# Patient Record
Sex: MALE | Race: WHITE | NOT HISPANIC OR LATINO | URBAN - METROPOLITAN AREA
[De-identification: names, ages, dates, MRNs, and addresses within clinical notes are randomized per-mention and may not be internally consistent; named-entity substitution may affect disease eponyms.]

---

## 2023-06-30 LAB
ALANINE AMINOTRANSFERASE (SGPT) (U/L) IN SER/PLAS: 22 U/L (ref 10–52)
ALBUMIN (G/DL) IN SER/PLAS: 4.6 G/DL (ref 3.4–5)
ALKALINE PHOSPHATASE (U/L) IN SER/PLAS: 43 U/L (ref 33–136)
ANION GAP IN SER/PLAS: 12 MMOL/L (ref 10–20)
ASPARTATE AMINOTRANSFERASE (SGOT) (U/L) IN SER/PLAS: 23 U/L (ref 9–39)
BILIRUBIN TOTAL (MG/DL) IN SER/PLAS: 1.1 MG/DL (ref 0–1.2)
CALCIUM (MG/DL) IN SER/PLAS: 9.9 MG/DL (ref 8.6–10.3)
CARBON DIOXIDE, TOTAL (MMOL/L) IN SER/PLAS: 27 MMOL/L (ref 21–32)
CHLORIDE (MMOL/L) IN SER/PLAS: 101 MMOL/L (ref 98–107)
CREATININE (MG/DL) IN SER/PLAS: 1.39 MG/DL (ref 0.5–1.3)
ERYTHROCYTE DISTRIBUTION WIDTH (RATIO) BY AUTOMATED COUNT: 13.4 % (ref 11.5–14.5)
ERYTHROCYTE MEAN CORPUSCULAR HEMOGLOBIN CONCENTRATION (G/DL) BY AUTOMATED: 33.5 G/DL (ref 32–36)
ERYTHROCYTE MEAN CORPUSCULAR VOLUME (FL) BY AUTOMATED COUNT: 91 FL (ref 80–100)
ERYTHROCYTES (10*6/UL) IN BLOOD BY AUTOMATED COUNT: 5.32 X10E12/L (ref 4.5–5.9)
GFR MALE: 56 ML/MIN/1.73M2
GLUCOSE (MG/DL) IN SER/PLAS: 86 MG/DL (ref 74–99)
HEMATOCRIT (%) IN BLOOD BY AUTOMATED COUNT: 48.3 % (ref 41–52)
HEMOGLOBIN (G/DL) IN BLOOD: 16.2 G/DL (ref 13.5–17.5)
LEUKOCYTES (10*3/UL) IN BLOOD BY AUTOMATED COUNT: 5.4 X10E9/L (ref 4.4–11.3)
NRBC (PER 100 WBCS) BY AUTOMATED COUNT: 0 /100 WBC (ref 0–0)
PLATELETS (10*3/UL) IN BLOOD AUTOMATED COUNT: 210 X10E9/L (ref 150–450)
POTASSIUM (MMOL/L) IN SER/PLAS: 4 MMOL/L (ref 3.5–5.3)
PROTEIN TOTAL: 7.4 G/DL (ref 6.4–8.2)
SODIUM (MMOL/L) IN SER/PLAS: 136 MMOL/L (ref 136–145)
UREA NITROGEN (MG/DL) IN SER/PLAS: 18 MG/DL (ref 6–23)

## 2023-07-05 ENCOUNTER — HOSPITAL ENCOUNTER (OUTPATIENT)
Dept: DATA CONVERSION | Facility: HOSPITAL | Age: 63
End: 2023-07-06
Attending: ORTHOPAEDIC SURGERY | Admitting: ORTHOPAEDIC SURGERY

## 2023-07-05 DIAGNOSIS — M25.751 OSTEOPHYTE, RIGHT HIP: ICD-10-CM

## 2023-07-05 DIAGNOSIS — I25.10 ATHEROSCLEROTIC HEART DISEASE OF NATIVE CORONARY ARTERY WITHOUT ANGINA PECTORIS: ICD-10-CM

## 2023-07-05 DIAGNOSIS — I10 ESSENTIAL (PRIMARY) HYPERTENSION: ICD-10-CM

## 2023-07-05 DIAGNOSIS — I25.2 OLD MYOCARDIAL INFARCTION: ICD-10-CM

## 2023-07-05 DIAGNOSIS — M19.90 UNSPECIFIED OSTEOARTHRITIS, UNSPECIFIED SITE: ICD-10-CM

## 2023-07-05 DIAGNOSIS — M16.11 UNILATERAL PRIMARY OSTEOARTHRITIS, RIGHT HIP: ICD-10-CM

## 2023-07-06 LAB
ANION GAP IN SER/PLAS: 11 MMOL/L (ref 10–20)
BASOPHILS (10*3/UL) IN BLOOD BY AUTOMATED COUNT: 0.02 X10E9/L (ref 0–0.1)
BASOPHILS/100 LEUKOCYTES IN BLOOD BY AUTOMATED COUNT: 0.3 % (ref 0–2)
CALCIUM (MG/DL) IN SER/PLAS: 8.5 MG/DL (ref 8.6–10.3)
CARBON DIOXIDE, TOTAL (MMOL/L) IN SER/PLAS: 25 MMOL/L (ref 21–32)
CHLORIDE (MMOL/L) IN SER/PLAS: 102 MMOL/L (ref 98–107)
CREATININE (MG/DL) IN SER/PLAS: 1.3 MG/DL (ref 0.5–1.3)
EOSINOPHILS (10*3/UL) IN BLOOD BY AUTOMATED COUNT: 0.02 X10E9/L (ref 0–0.7)
EOSINOPHILS/100 LEUKOCYTES IN BLOOD BY AUTOMATED COUNT: 0.3 % (ref 0–6)
ERYTHROCYTE DISTRIBUTION WIDTH (RATIO) BY AUTOMATED COUNT: 13.5 % (ref 11.5–14.5)
ERYTHROCYTE MEAN CORPUSCULAR HEMOGLOBIN CONCENTRATION (G/DL) BY AUTOMATED: 33.2 G/DL (ref 32–36)
ERYTHROCYTE MEAN CORPUSCULAR VOLUME (FL) BY AUTOMATED COUNT: 91 FL (ref 80–100)
ERYTHROCYTES (10*6/UL) IN BLOOD BY AUTOMATED COUNT: 4.15 X10E12/L (ref 4.5–5.9)
GFR MALE: 61 ML/MIN/1.73M2
GLUCOSE (MG/DL) IN SER/PLAS: 141 MG/DL (ref 74–99)
HEMATOCRIT (%) IN BLOOD BY AUTOMATED COUNT: 37.6 % (ref 41–52)
HEMOGLOBIN (G/DL) IN BLOOD: 12.5 G/DL (ref 13.5–17.5)
IMMATURE GRANULOCYTES/100 LEUKOCYTES IN BLOOD BY AUTOMATED COUNT: 0.5 % (ref 0–0.9)
LEUKOCYTES (10*3/UL) IN BLOOD BY AUTOMATED COUNT: 7.9 X10E9/L (ref 4.4–11.3)
LYMPHOCYTES (10*3/UL) IN BLOOD BY AUTOMATED COUNT: 1 X10E9/L (ref 1.2–4.8)
LYMPHOCYTES/100 LEUKOCYTES IN BLOOD BY AUTOMATED COUNT: 12.6 % (ref 13–44)
MONOCYTES (10*3/UL) IN BLOOD BY AUTOMATED COUNT: 0.81 X10E9/L (ref 0.1–1)
MONOCYTES/100 LEUKOCYTES IN BLOOD BY AUTOMATED COUNT: 10.2 % (ref 2–10)
NEUTROPHILS (10*3/UL) IN BLOOD BY AUTOMATED COUNT: 6.02 X10E9/L (ref 1.2–7.7)
NEUTROPHILS/100 LEUKOCYTES IN BLOOD BY AUTOMATED COUNT: 76.1 % (ref 40–80)
NRBC (PER 100 WBCS) BY AUTOMATED COUNT: 0 /100 WBC (ref 0–0)
PLATELETS (10*3/UL) IN BLOOD AUTOMATED COUNT: 146 X10E9/L (ref 150–450)
POTASSIUM (MMOL/L) IN SER/PLAS: 3.8 MMOL/L (ref 3.5–5.3)
SODIUM (MMOL/L) IN SER/PLAS: 134 MMOL/L (ref 136–145)
UREA NITROGEN (MG/DL) IN SER/PLAS: 12 MG/DL (ref 6–23)

## 2023-07-07 LAB
ANION GAP IN SER/PLAS: NORMAL
BASOPHILS (10*3/UL) IN BLOOD BY AUTOMATED COUNT: NORMAL
BASOPHILS/100 LEUKOCYTES IN BLOOD BY AUTOMATED COUNT: NORMAL
CALCIUM (MG/DL) IN SER/PLAS: NORMAL
CARBON DIOXIDE, TOTAL (MMOL/L) IN SER/PLAS: NORMAL
CHLORIDE (MMOL/L) IN SER/PLAS: NORMAL
CREATININE (MG/DL) IN SER/PLAS: NORMAL
EOSINOPHILS (10*3/UL) IN BLOOD BY AUTOMATED COUNT: NORMAL
EOSINOPHILS/100 LEUKOCYTES IN BLOOD BY AUTOMATED COUNT: NORMAL
ERYTHROCYTE DISTRIBUTION WIDTH (RATIO) BY AUTOMATED COUNT: NORMAL
ERYTHROCYTE MEAN CORPUSCULAR HEMOGLOBIN CONCENTRATION (G/DL) BY AUTOMATED: NORMAL
ERYTHROCYTE MEAN CORPUSCULAR VOLUME (FL) BY AUTOMATED COUNT: NORMAL
ERYTHROCYTES (10*6/UL) IN BLOOD BY AUTOMATED COUNT: NORMAL
GFR FEMALE: NORMAL
GFR MALE: NORMAL
GLUCOSE (MG/DL) IN SER/PLAS: NORMAL
HEMATOCRIT (%) IN BLOOD BY AUTOMATED COUNT: NORMAL
HEMOGLOBIN (G/DL) IN BLOOD: NORMAL
IMMATURE GRANULOCYTES/100 LEUKOCYTES IN BLOOD BY AUTOMATED COUNT: NORMAL
LEUKOCYTES (10*3/UL) IN BLOOD BY AUTOMATED COUNT: NORMAL
LYMPHOCYTES (10*3/UL) IN BLOOD BY AUTOMATED COUNT: NORMAL
LYMPHOCYTES/100 LEUKOCYTES IN BLOOD BY AUTOMATED COUNT: NORMAL
MANUAL DIFFERENTIAL Y/N: NORMAL
MONOCYTES (10*3/UL) IN BLOOD BY AUTOMATED COUNT: NORMAL
MONOCYTES/100 LEUKOCYTES IN BLOOD BY AUTOMATED COUNT: NORMAL
NEUTROPHILS (10*3/UL) IN BLOOD BY AUTOMATED COUNT: NORMAL
NEUTROPHILS/100 LEUKOCYTES IN BLOOD BY AUTOMATED COUNT: NORMAL
NRBC (PER 100 WBCS) BY AUTOMATED COUNT: NORMAL
PLATELETS (10*3/UL) IN BLOOD AUTOMATED COUNT: NORMAL
POTASSIUM (MMOL/L) IN SER/PLAS: NORMAL
SODIUM (MMOL/L) IN SER/PLAS: NORMAL
UREA NITROGEN (MG/DL) IN SER/PLAS: NORMAL

## 2023-09-29 VITALS — BODY MASS INDEX: 24.26 KG/M2 | HEIGHT: 68 IN | WEIGHT: 160.05 LBS

## 2023-09-30 NOTE — PROGRESS NOTES
Service: Orthopaedics     Subjective Data:   JOSE LUIS JIMENEZ is a 63 year old Male who is Hospital Day # 2 and POD #1 for 1. RIGHT HIP REPLACEMENT;    Objective Data:     Objective Information:      T   P  R  BP   MAP  SpO2   Value  36.6  48  16  115/77      97%  Date/Time 7/6 8:16 7/6 8:16 7/6 8:16 7/6 8:16    7/6 8:16  Range  (35.9C - 37C )  (46 - 66 )  (16 - 20 )  (92 - 147 )/ (53 - 99 )    (96% - 99% )  Highest temp of 37 C was recorded at 7/6 0:00      Pain reported at 7/6 8:30: 4 = Moderate    ---- Intake and Output  -----  Mn/Dy/Year Time  Intake   Output  Net  Jul 6, 2023 6:00 am  585   750  -165  Jul 5, 2023 10:00 pm  500   0  500  Jul 5, 2023 2:00 pm  1400   0  1400    The Intake and Output Totals for the last 24 hours are:      Intake   Output  Net      2485   750  1735      T   P  R  BP   MAP  SpO2   Value  36.6  48  16  115/77      97%  Date/Time 7/6 8:16 7/6 8:16 7/6 8:16 7/6 8:16    7/6 8:16  Range  (35.9C - 37C )  (46 - 66 )  (16 - 20 )  (92 - 147 )/ (53 - 99 )    (96% - 99% )  Highest temp of 37 C was recorded at 7/6 0:00    Physical Exam Narrative:  ·  Physical Exam:    Postop day 1-right total hip arthroplasty  Patient has been mobilizing well  Notices gradual improvement  Right hip  Surgical site is clean and dry  He can actively flex and extend his knee and his ankle  Calf muscles are soft        Medication:    Medications:          Continuous Medications       --------------------------------    1. Lactated Ringers Infusion:  1000  mL  IntraVenous  <Continuous>         Scheduled Medications       --------------------------------    1. Acetaminophen:  650  mg  Oral  Every 6 Hours    2. Aspirin Enteric Coated:  81  mg  Oral  2 Times a Day    3. ceFAZolin 2 gram/ D5W 100 mL Premix IVPB:  100  mL  IntraVenous Piggyback  Once    4. Docusate:  100  mg  Oral  2 Times a Day    5. Polyethylene Glycol:  17  gram(s)  Oral  2 Times a Day         PRN Medications        --------------------------------    1. Cyclobenzaprine:  10  mg  Oral  3 Times a Day    2. diphenhydrAMINE:  25  mg  Oral  Every 6 Hours    3. Ketorolac Injectable:  15  mg  IntraVenous Push  Every 6 Hours    4. Magnesium Hydroxide -Al Hydrox -Simethicone Oral Liquid:  30  mL  Oral  Every 6 Hours    5. Morphine Injectable:  2  mg  IntraVenous Push  Every 4 Hours    6. Ondansetron Injectable:  4  mg  IntraVenous Push  Every 6 Hours    7. oxyCODONE Immediate Release:  10  mg  Oral  Every 4 Hours    8. oxyCODONE Immediate Release:  5  mg  Oral  Every 4 Hours        Recent Lab Results:    Results:    CBC: 7/6/2023 05:42              \     Hgb     /                              \     12.5 L    /  WBC  ----------------  Plt               7.9       ----------------    146 L            /     Hct     \                              /     37.6 L    \            RBC: 4.15 L    MCV: 91     Neutrophil %: 76.1      BMP: 7/6/2023 05:42  NA+        Cl-     BUN  /                         134 L   102    12  /  --------------------------------  Glucose                ---------------------------  141 H    K+     HCO3-   Creat \                         3.8  25    1.30  \  Calcium : 8.5 L    Anion Gap : 11        I have reviewed these laboratory results:    Complete Blood Count + Differential  06-Jul-2023 05:42:00      Result Value    White Blood Cell Count  7.9    Nucleated Erythrocyte Count  0.0    Red Blood Cell Count  4.15   L   HGB  12.5   L   HCT  37.6   L   MCV  91    MCHC  33.2    PLT  146   L   RDW-CV  13.5    Neutrophil %  76.1    Immature Granulocytes %  0.5    Lymphocyte %  12.6    Monocyte %  10.2    Eosinophil %  0.3    Basophil %  0.3    Neutrophil Count  6.02    Lymphocyte Count  1.00   L   Monocyte Count  0.81    Eosinophil Count  0.02    Basophil Count  0.02      Basic Metabolic Panel  06-Jul-2023 05:42:00      Result Value    Glucose, Serum  141   H   NA  134   L   K  3.8    CL  102    Bicarbonate, Serum  25     Anion Gap, Serum  11    BUN  12    CREAT  1.30    GFR Male  61    Calcium, Serum  8.5   L       Radiology Results:    Results:        Impression:    Stable right hip arthroplasty with intact hardware.        Xray Hip 2 View [Jul 5 2023  5:22PM]      Impression:    Right total hip arthroplasty with no acute bony abnormality.     Xray Pelvis 1 or 2 View [Jul 5 2023  2:34PM]    X-ray of the pelvis AP view  Right total hip arthroplasty is in satisfactory alignment and position  Implant bone interface is satisfactory      Assessment and Plan:   Code Status:  ·  Code Status Full Code       Impression 1: Postop day 1-right total hip arthroplasty   Plan for Impression 1: Patient is progressing well  Denies any significant discomfort in his right hip  He is mobilizing full weightbearing on his hip    May be discharged home today with home PT  Follow-up in the orthopedic office in approximately 10 days       Electronic Signatures:  Raven Short)  (Signed 06-Jul-2023 12:14)   Authored: Service, Subjective Data, Objective Data, Assessment  and Plan, Note Completion      Last Updated: 06-Jul-2023 12:14 by Raven Short)

## 2023-09-30 NOTE — H&P
History & Physical Reviewed:   I have reviewed the History and Physical dated:  30-Jun-2023   History and Physical reviewed and relevant findings noted. Patient examined to review pertinent physical  findings.: No significant changes   Home Medications Reviewed: no changes noted   Allergies Reviewed: no changes noted       ERAS (Enhanced Recovery After Surgery):  ·  ERAS Patient: no     Consent:   COVID-19 Consent:  ·  COVID-19 Risk Consent Surgeon has reviewed key risks related to the risk of elton COVID-19 and if they contract COVID-19 what the risks are.       Electronic Signatures:  Raven Short)  (Signed 05-Jul-2023 13:59)   Authored: History & Physical Reviewed, ERAS, Consent,  Note Completion      Last Updated: 05-Jul-2023 13:59 by Raven Short)

## 2023-09-30 NOTE — DISCHARGE SUMMARY
Send Summary:   Discharge Summary Providers:  Provider Role Provider Name   · Attending Raven Short   · Referring Raven Short   · Primary Rose Porter       Note Recipients: Rose Porter MD - [id number] []  Raven Short MD       Discharge:    Summary:   Admission Date: .05-Jul-2023 09:35:00   Discharge Date: 06-Jul-2023   Attending Physician at Discharge: Raven Short   Admission Reason: right hip osteoarthritis   Final Discharge Diagnoses: Acute postoperative pain   Procedures: Date: 05-Jul-2023 13:30:00  Procedure Name: 1. RIGHT HIP REPLACEMENT   Condition at Discharge: Satisfactory   Disposition at Discharge: Home Health Care - New   Vital Signs:        T   P  R  BP   MAP  SpO2   Value  36.6  48  16  115/77      97%  Date/Time 7/6 8:16 7/6 8:16 7/6 8:16 7/6 8:16    7/6 8:16  Range  (35.9C - 37C )  (46 - 66 )  (16 - 20 )  (92 - 147 )/ (53 - 99 )    (96% - 99% )  Highest temp of 37 C was recorded at 7/6 0:00    Date:            Weight/Scale Type:  Height:   05-Jul-2023 17:20  72.6  kg         172.3  cm  Physical Exam:    Constitutional: Well developed, awake/alert,  no distress, alert and cooperative   Eyes: EOMI, clear sclera   ENMT: mucous membranes moist, no lesions  seen   Respiratory/Thorax: Patent airways, with  good chest expansion, thorax symmetric   Musculoskeletal: Right hip dressing dry and  intact.   Bilateral lower extremities distally with intact dorsiflexion/plantarflexion/EHL.  DP palpable 2+/2   Neurological: alert,  intact senses   Lymphatic: No significant lymphadenopathy   Psychological: Appropriate mood and behavior     Hospital Course:    Hospital day #2, postoperative day #1 of right total hip arthroplasty for osteoarthritis right hip.  Patient had satisfactory postop course. He was noted to have  mild asymptomatic bradycardia intraoperatively, HR in 50's,  was monitored closely on telemetry and advised to follow up with his cardiologist,   Jonathan within the next week. Otherwise, he had no major complications. He fully participated in physical  and Occupational Therapy.   Used his incentive spirometry as directed.  Tolerated diet with no nausea vomiting, passed flatus, and urinated well.  Discharge to home with home health care in satisfactory condition.      Discharge Information:    and Continuing Care:   Lab Results - Pending:    None  Radiology Results - Pending: None   Discharge Instructions:    Activity:           activity as tolerated.          May shower..            May not return to school/work until follow-up visit with.            May not drive until follow-up visit.  or while taking narcotic pain medication          No pushing, pulling, or lifting objects greater than 5 pounds until follow-up visit.            Weight-bearing Instructions: weight-bearing as tolerated right leg.      Nutrition/Diet:           regular    Wound Care:           Wound Site:   right hip          Wound Type:   surgical incision          Cleanse With:   soap and water          Instructions:   no lotions, creams, or tub soaks          Other Instructions:   may remove surgical dressing post operative day #7 and leave open to air if no drainage    Additional Orders:           Special Equipment:   walker          Additional Instructions:   *Call Dr. Short for any problems and/or concerns.  *Maintain hip precautions  *Weight bearing as tolerated  *You may shower, do not soak or scrub incision; pat dry  *Apply ice to hip as needed to minimize swelling, on 20 minutes, off 20 minutes  *Continue the coughing and deep breathing exercises that you learned in the hospital  *Move around as much as you can tolerate because movement can help you heal and helps prevent stiffness, skin sores, and blood clots    Call Doctor right away for:  *Increased hip pain  *Pain or swelling in your calf of leg  *Fever above 100.4/shaking chills  *Excessive swelling, increased redness or  drainage around the incision  *Your incision breaks open  *Chest pain/shortness of breath, call 911    After the procedure it is common to have pain and swelling.      -To help prevent hip dislocation, follow instructions about movement restrictions as told by your physician:  *Do not cross your legs at the knees. To remind yourself about this, you may keep a pillow between your legs while lying in bed  *Do not bend at the hip and waist or bend farther than 90 degrees of flexion.   To avoid bending this far: do not bring your knees higher than your hips, do not  something from the floor while sitting in a chair, avoid sitting in low chairs, used raised toilet seat (if needed), when standing up from a seated position- keep  injured leg out in front of you  *Avoid twisting at your waist and reaching across your body to the side of the affected leg  *Avoid rotating your toes inward on the affected leg    *When getting into a car:  1. Raise the seat as high as possible, move the seat as far back as it will go, and recline the upper part of the seat slightly  2. Sit down on the seat with your injured leg extended out of the car  3. Scoot back in the seat as you move the lower half of your body into the car. Try to avoid bumping your foot or leg as you bring it into the car. To make this motion smooth and easy on a cloth seat, try placing a plastic bag on the seat    *If your physician has prescribed compression stockings please use as directed. These stockings help to prevent blood clots and reduce swelling in your legs  *Continue to do the exercises you learned in the hospital: i.e. ankle pumps  *If you were prescribed a blood thinner (anticoagulant), take it as prescribed    *Do not use any products that contain nicotine or tobacco, such as cigarettes and e-cigarettes. These can delay bone healing. If you need help quitting, ask your healthcare provider    If you are taking prescription pain medication, take  actions to prevent or treat constipation.  -drink enough fluids to keep your urine pale yellow  -eat foods that are high in fiber, such as fresh fruits and vegetables, whole grains, and beans  -limit food that are high in fat and processed sugars , such as fried or sweet foods  -take an over the counter or prescribed medicine for constipation.    Home Care Certification:           Home Care Agency:   Other (with phone number)   [facility]: [phone]          Skilled Disciplines Ordered:   PT,  OT    Home Care Services:           Home Care Skilled Service:   Rehab (PT/OT/SP eval and treat)    Follow Up Appointments:    Follow-Up Appointment 01:           Physician/Dept/Service:   Dr. Short          Reason for Referral:   post op visit/incision check          Call to Schedule in:   2 weeks          Phone Number:   [phone]    Follow-Up Appointment 02:           Physician/Dept/Service:   Dr. Castillo          Reason for Referral:   bradycardia, HR in 50's          Call to Schedule in:   5-7 days          Phone Number:   [phone]    Discharge Medications: Home Medication   aspirin 81 mg oral delayed release tablet - 1 tab(s) orally 2 times a day for 30 days to help reduce your risk for blood clots  docusate sodium 100 mg oral capsule - 1 cap(s) orally 2 times a day -for constipation   celecoxib 200 mg oral capsule - 1 cap(s) orally once a day      PRN Medication   scopolamine 1 mg/72 hr transdermal film, extended release - 1 patch transdermally once, As Needed -for nausea   oxyCODONE 5 mg oral tablet - 1 tab(s) orally every 6 hours, As Needed -Pain - Mod (4-6) to severe pain     DNR Status:   ·  Code Status Code Status order at time of discharge: Full Code       Electronic Signatures:  Abida Evangelista (PAC)  (Signed 06-Jul-2023 14:32)   Authored: Send Summary, Summary Content, Ongoing Care,  DNR Status, Note Completion      Last Updated: 06-Jul-2023 14:32 by Abida Evangelista (PAC)

## 2023-09-30 NOTE — PROGRESS NOTES
Service: Orthopaedics     Subjective Data:   JOSE LUIS JIMENEZ is a 63 year old Male who is Hospital Day # 2 and POD #1 for 1. RIGHT HIP REPLACEMENT;    Additional Information:    Mr. Jimenez describes minimal right hip discomfort.  His pain is controlled by oral pain medication.  He is looking forward to working with therapy today.    Objective Data:     Objective Information:      T   P  R  BP   MAP  SpO2   Value  36.6  48  16  115/77      97%  Date/Time 7/6 8:16 7/6 8:16 7/6 8:16 7/6 8:16    7/6 8:16  Range  (35.9C - 37C )  (46 - 66 )  (16 - 20 )  (92 - 147 )/ (53 - 99 )    (96% - 99% )  Highest temp of 37 C was recorded at 7/6 0:00      Pain reported at 7/6 0:00: 0 = None    ---- Intake and Output  -----  Mn/Dy/Year Time  Intake   Output  Net  Jul 6, 2023 6:00 am  585   750  -165  Jul 5, 2023 10:00 pm  500   0  500  Jul 5, 2023 2:00 pm  1400   0  1400    The Intake and Output Totals for the last 24 hours are:      Intake   Output  Net      9325   750  1735    Physical Exam by System:    Constitutional: Well developed, awake/alert, no distress,  alert and cooperative   Eyes: EOMI, clear sclera   ENMT: mucous membranes moist, no lesions seen   Respiratory/Thorax: Patent airways, with good chest  expansion, thorax symmetric   Musculoskeletal: Right hip dressing dry and intact.    Bilateral lower extremities distally with intact dorsiflexion/plantarflexion/EHL.  DP palpable 2+/2   Neurological: alert,  intact senses   Lymphatic: No significant lymphadenopathy   Psychological: Appropriate mood and behavior     Medication:    Medications:          Continuous Medications       --------------------------------    1. Lactated Ringers Infusion:  1000  mL  IntraVenous  <Continuous>         Scheduled Medications       --------------------------------    1. Acetaminophen:  650  mg  Oral  Every 6 Hours    2. Aspirin Enteric Coated:  81  mg  Oral  2 Times a Day    3. ceFAZolin 2 gram/ D5W 100 mL Premix IVPB:  100  mL   IntraVenous Piggyback  Once    4. Docusate:  100  mg  Oral  2 Times a Day    5. Polyethylene Glycol:  17  gram(s)  Oral  2 Times a Day         PRN Medications       --------------------------------    1. Cyclobenzaprine:  10  mg  Oral  3 Times a Day    2. diphenhydrAMINE:  25  mg  Oral  Every 6 Hours    3. Ketorolac Injectable:  15  mg  IntraVenous Push  Every 6 Hours    4. Magnesium Hydroxide -Al Hydrox -Simethicone Oral Liquid:  30  mL  Oral  Every 6 Hours    5. Morphine Injectable:  2  mg  IntraVenous Push  Every 4 Hours    6. Ondansetron Injectable:  4  mg  IntraVenous Push  Every 6 Hours    7. oxyCODONE Immediate Release:  10  mg  Oral  Every 4 Hours    8. oxyCODONE Immediate Release:  5  mg  Oral  Every 4 Hours        Recent Lab Results:    Results:    CBC: 7/6/2023 05:42              \     Hgb     /                              \     12.5 L    /  WBC  ----------------  Plt               7.9       ----------------    146 L            /     Hct     \                              /     37.6 L    \            RBC: 4.15 L    MCV: 91     Neutrophil %: 76.1      BMP: 7/6/2023 05:42  NA+        Cl-     BUN  /                         134 L   102    12  /  --------------------------------  Glucose                ---------------------------  141 H    K+     HCO3-   Creat \                         3.8  25    1.30  \  Calcium : 8.5 L    Anion Gap : 11      Radiology Results:    Results:        Impression:    Stable right hip arthroplasty with intact hardware.        Xray Hip 2 View [Jul 5 2023  5:22PM]      Assessment and Plan:   Code Status:  ·  Code Status Full Code       Impression 1: Primary localized osteoarthritis of  right hip   Plan for Impression 1: Postop day #1 status post  right total hip arthroplasty  PT/OT, weightbearing as tolerated right lower extremity  Pain regimen: Good pain control with intermittent oxycodone and Toradol overnight  Bowel regimen: Colace and MiraLAX  Hemoglobin: 12 .5  VTE  prophylaxis: Aspirin 81 mg twice daily and SCDs   disposition: Will discharge with home health care when appropriate  Follow-up with Dr. Pulido for in 2 weeks       Electronic Signatures:  Abida Evangelista (PAC)  (Signed 06-Jul-2023 11:25)   Authored: Service, Subjective Data, Objective Data, Assessment  and Plan, Note Completion      Last Updated: 06-Jul-2023 11:25 by Abida Evangelista (PAC)

## 2023-10-02 NOTE — OP NOTE
PROCEDURE DETAILS    Preoperative Diagnosis:  Unilateral osteoarthritis of hip, right, M16.11    Postoperative Diagnosis:  Primary osteoarthritis-right hip  Surgeon: Raven Short  Resident/Fellow/Other Assistant: Jas Richardson    Procedure:  1. RIGHT HIP REPLACEMENT    Anesthesia: No anesthesiologist associated with this case  Estimated Blood Loss: 200 mL  Blood Replaced: None    Findings: Severe arthritis in the right hip  Shallow acetabulum    Specimens(s) Collected: no,     Complications: None    IV Fluids: 1400 mL  Urine Output: Not measured  Drains and/or Catheters: None    Implants: Seth Accolade total hip arthroplasty  Femur size 4 with 127 degree neck, acetabulum size 52 with a single screw, standard liner, 36 ceramic -2.5 mm head.  Tourniquet Times: Not applicable    Patient Returned To/Condition: PACU in stable condition          Operative Report:   INDICATIONS FOR SURGERY:    The patient is a 63 year-old male who presented with increasing pain and discomfort in his right hip.  He had tried all conservative measures and failed,.  He noticed pain and discomfort in the right hip, and x-rays revealed moderate to severe degenerative  changes in the right hip.  The risks and benefits of a total hip replacement have been discussed in detail.    The risks of anesthesia; risk of injury to the vessel, nerve, ligament, fractures; postoperative complications such as hematoma formation, infection, DVT, PE, continued pain in the hip, restricted range of motion, limb length discrepancy, dislocation,  prolonged period of rehab and therapy, need for further surgical intervention, osteolysis, loosening, failure of the implant have been discussed. Wound healing problems and Complications from her medical condition were also discussed.  Need for blood  transfusion was discussed.  After careful consideration of all the options, he agrees to have surgical procedure done.     OPERATIVE NOTE:    The patient  was brought to the operating room and spinal anesthetic was induced by the Anesthetic services.  The patient was positioned laterally.  All bony prominences were well padded.  The right lower extremity was prepped and draped in usual sterile  fashion.  The patient had 2 g of Ancef given prior to the start of the procedure. This will be continued for 24 hours.  DVT prophylaxis will begin postoperatively in the form of aspirin.  Early mobilization will be encouraged.  SCD devices will be applied.   The patient had 1 g of TXA given prior to the start of the procedure.  The skin and subcutaneous tissue had been marked.  A time-out was called.  An [place] drape was used.      The skin was cut in a posterolateral fashion.  The patient has minimal subcutaneous fat present around the greater trochanteric area.  This was carefully deepened down and the greater trochanter was identified.  The fascia was identified and cut along  the direction of the incision.  The self-retaining retractors were placed.  The surgical site was repeatedly irrigated with dilute Betadine solution.  The sciatic nerve was palpated, kept well out of the way throughout the procedure.  The short external  rotators were carefully cut.  Short external rotators were cut and carefully retracted.  The capsule was also similarly cut and retracted.  The head of the femur was visualized.  The head was dislocated and brought into view.  The offset and the neck  length were measured and these were used to check at the end of the procedure.  The neck was cut retaining about 15 mm of neck.  The head was removed.  Moderate to severe degenerative changes were noticed in the femoral head with loss of cartilage,      Similar changes were noticed in the acetabulum.  The acetabulum was shallow, the remnants of the labrum were carefully excised anteriorly and posteriorly and the retractors were placed carefully over the anterior and posterior aspect to retract all  the  soft tissues.  The hip joint was irrigated out once again.  Attention was first turned towards the acetabulum.  Osteophytes anteriorly and inferiorly carefully removed using the cold osteotome and a rongeur.  Care was taken to avoid any damage to  the surrounding neurovascular structures.  Reaming of the acetabulum was then commenced starting with a size 48 up to a size 52.  The 52 trial component was placed and excellent fit was obtained.  Reaming was done in about 45 degrees of closure and 15  degrees of anteversion.  The trial component gave an excellent fit. The trial component was removed.  The true component was taken and an excellent fit was obtained.  This was a cluster hole cup.  Two screws was used to augment the fixation.    The patient  had a 0-degree  liner was trialed and therefore a 0-degree lip liner was used.    Attention was turned towards the femur.  This was fully internally rotated and brought into view.  A box osteotome was used to make an opening in the piriformis fossa.  T-handle reamer was used to go down the shaft.  Progressive broaching was then commenced  starting with a size 0. up to a size 4.  The size 4 broach gave an excellent fit.  A trial reduction was done with a -2.5 mm neck with 36 head.  The hip was put through a full range of motion.  A combined anteversion of 30 degrees was noted.  The hip  joint was irrigated out completely.  The true liner with a 10-degree lip was locked into the acetabulum.  There was no instability.  The limb length had been reproduced.  The offset and the neck length had been checked and were reproduced.  There was  no impingement  noted posteriorly.  Osteophytes were removed anteriorly.  The 0-degree lip liner was locked into place, followed by placing the size 4 femoral  component in valgus position and 10 degrees of anteversion.  The 36  ceramic head was placed  and carefully reduced, checked for stability, put through its full range of motion, noted  to be entirely satisfactory.  There was no instability noted.  The hip joint was once again irrigated out.  The short external rotators and capsule were repaired  back to the insertion by means of drill holes. The fascia kierra was closed with a combination of Ethibond and 0 Vicryl.  Subcutaneous layers were closed with 2-0 Vicryl.  Deeper fat sutures were also placed skin was closed with stratafix. Meplex dressing  wasa applied. The patient made a satisfactory recovery. He will mobilize full weight bearing on his hip.    Mixture made up to 50 mL, consisting of ropivacaine, epinephrine, clonidine, Toradol, was injected into the anterior and posterior capsular region.  Injections were also given taking care to avoid any inadvertent injection into the vessels, injection  was also given into the subcutaneous tissue.  This mixture was given along the subcutaneous plane throughout the incision site.      The role of the assistants involved were in prepping of the involved lower extremity and positioning the patient.  During the surgical procedure retraction of the soft tissues were was done by then.  Manipulation of the hip was required during placement  of the implants.  They were also involved in closure of the deeper structures,  subcutaneous layers as well as the skin.  Application of the dressings as well as bring the patient from the operating table to the regular bed.  No qualified resident was available to assist in this case                          Attestation:   Note Completion:  Attending Attestation I performed the procedure without a resident         Electronic Signatures:  Raven Short)  (Signed 05-Jul-2023 13:43)   Authored: Post-Operative Note, Chart Review, Note Completion      Last Updated: 05-Jul-2023 13:43 by Raven Short)